# Patient Record
Sex: FEMALE | Race: WHITE
[De-identification: names, ages, dates, MRNs, and addresses within clinical notes are randomized per-mention and may not be internally consistent; named-entity substitution may affect disease eponyms.]

---

## 2018-04-29 ENCOUNTER — HOSPITAL ENCOUNTER (EMERGENCY)
Dept: HOSPITAL 89 - ER | Age: 48
Discharge: HOME | End: 2018-04-29
Payer: MEDICARE

## 2018-04-29 ENCOUNTER — HOSPITAL ENCOUNTER (OUTPATIENT)
Dept: HOSPITAL 89 - AMB | Age: 48
End: 2018-04-29
Payer: MEDICARE

## 2018-04-29 VITALS — DIASTOLIC BLOOD PRESSURE: 63 MMHG | SYSTOLIC BLOOD PRESSURE: 95 MMHG

## 2018-04-29 DIAGNOSIS — G40.909: ICD-10-CM

## 2018-04-29 DIAGNOSIS — R10.30: Primary | ICD-10-CM

## 2018-04-29 DIAGNOSIS — N30.00: Primary | ICD-10-CM

## 2018-04-29 DIAGNOSIS — R19.34: ICD-10-CM

## 2018-04-29 LAB — PLATELET COUNT, AUTOMATED: 187 K/UL (ref 150–450)

## 2018-04-29 PROCEDURE — 82947 ASSAY GLUCOSE BLOOD QUANT: CPT

## 2018-04-29 PROCEDURE — 81001 URINALYSIS AUTO W/SCOPE: CPT

## 2018-04-29 PROCEDURE — 83690 ASSAY OF LIPASE: CPT

## 2018-04-29 PROCEDURE — 84520 ASSAY OF UREA NITROGEN: CPT

## 2018-04-29 PROCEDURE — 82247 BILIRUBIN TOTAL: CPT

## 2018-04-29 PROCEDURE — 84450 TRANSFERASE (AST) (SGOT): CPT

## 2018-04-29 PROCEDURE — 82040 ASSAY OF SERUM ALBUMIN: CPT

## 2018-04-29 PROCEDURE — 96375 TX/PRO/DX INJ NEW DRUG ADDON: CPT

## 2018-04-29 PROCEDURE — 84075 ASSAY ALKALINE PHOSPHATASE: CPT

## 2018-04-29 PROCEDURE — 82565 ASSAY OF CREATININE: CPT

## 2018-04-29 PROCEDURE — 84703 CHORIONIC GONADOTROPIN ASSAY: CPT

## 2018-04-29 PROCEDURE — 84155 ASSAY OF PROTEIN SERUM: CPT

## 2018-04-29 PROCEDURE — 96361 HYDRATE IV INFUSION ADD-ON: CPT

## 2018-04-29 PROCEDURE — 99283 EMERGENCY DEPT VISIT LOW MDM: CPT

## 2018-04-29 PROCEDURE — 84460 ALANINE AMINO (ALT) (SGPT): CPT

## 2018-04-29 PROCEDURE — 87088 URINE BACTERIA CULTURE: CPT

## 2018-04-29 PROCEDURE — 85025 COMPLETE CBC W/AUTO DIFF WBC: CPT

## 2018-04-29 PROCEDURE — 96374 THER/PROPH/DIAG INJ IV PUSH: CPT

## 2018-04-29 PROCEDURE — 84132 ASSAY OF SERUM POTASSIUM: CPT

## 2018-04-29 PROCEDURE — 82150 ASSAY OF AMYLASE: CPT

## 2018-04-29 PROCEDURE — 82435 ASSAY OF BLOOD CHLORIDE: CPT

## 2018-04-29 PROCEDURE — 82374 ASSAY BLOOD CARBON DIOXIDE: CPT

## 2018-04-29 PROCEDURE — 82310 ASSAY OF CALCIUM: CPT

## 2018-04-29 PROCEDURE — 84295 ASSAY OF SERUM SODIUM: CPT

## 2018-04-29 NOTE — ER REPORT
History and Physical


Time Seen By MD:  01:03


HPI/ROS


CHIEF COMPLAINT: Abdominal pain





HISTORY OF PRESENT ILLNESS: 47-year-old female with developmental delay and 

history of seizures brought in by ambulance complaining of lower abdominal pain 

for several hours.  She denies nausea or vomiting or diarrhea.  She denies 

dysuria.  Patient denies history of abdominal surgery.





REVIEW OF SYSTEMS:


Respiratory: No cough, no dyspnea.


Cardiovascular: No chest pain, no palpitations.


Gastrointestinal: As above


Musculoskeletal: No back pain.


Allergies:  


Coded Allergies:  


     sulfamethoxazole (Verified  Allergy, Intermediate, RASH, 4/29/18)


     trimethoprim (Verified  Allergy, Intermediate, RASH, 4/29/18)


Home Meds


Active Scripts


Cephalexin Monohydrate (CEPHALEXIN) 500 Mg Cap, 500 MG PO TID for infection, #

15 CAP


   TAKE 1 CAPSULE tid


   Prov:KIANNA FAUST DO         4/29/18


Discontinued Reported Medications


Prednisone (Prednisone) 20 Mg Tab, 20 MG PO QDAY, #5 0 Refills


   9/6/09


Fluconazole (Diflucan) 100 Mg Tab, 100 MG PO QDAY, 0 Refills


   CURRENTLY ON FOR TOE FUNGUS


   9/6/09


Omega-3 Fatty Acids (Fish Oil) 1 Cap Capsule, 1 CAP PO DAILY, 0 Refills


   9/6/09


Multivitamins W-Minerals (Multivitamin) 1 Cap Capsule, 1 CAP PO DAILY, 0 Refills


   9/6/09


Oxcarbazepine (Trileptal) 300 Mg Tab, 450 MG PO DAILY, 0 Refills


   9/6/09


Sertraline Hcl (Zoloft) 100 Mg Tablet, 150 MG PO QDAY, 0 Refills


   9/6/09


Topiramate (Topamax) 100 Mg Tab, 100 MG PO QDAY, 0 Refills


   9/6/09


Past Medical/Surgical History


Seizure disorder


Reviewed Nurses Notes:  Yes


Old Medical Records Reviewed:  Yes


Constitutional





Vital Sign - Last 24 Hours








 4/29/18 4/29/18 4/29/18 4/29/18





 01:04 01:06 01:14 01:18


 


Temp  98.7  


 


Pulse  77  


 


Resp  12  


 


B/P (MAP) 108/70 (83) 108/70  102/69 (80)


 


Pulse Ox  99 99 


 


O2 Delivery  Room Air  


 


    





 4/29/18 4/29/18 4/29/18 4/29/18





 01:30 01:35 01:50 02:00


 


Pulse  71 67 


 


B/P (MAP) 103/70 (81)   95/63 (74)


 


Pulse Ox  99 99 





 4/29/18   





 02:05   


 


Pulse 70   


 


Pulse Ox 98   








Physical Exam


  General Appearance: The patient is alert, has no immediate need for airway 

protection and no current signs of toxicity.  Vital signs stable, afebrile, 

pulse ox normal


HEENT: Pupils equal and round no injection.  Oropharynx without redness or 

exudate, mucous members are moist


Respiratory: Chest is non tender, lungs are clear to auscultation.


Cardiac: regular rate and rhythm


Gastrointestinal: Abdomen is soft and non tender, no masses, bowel sounds 

normal.


Musculoskeletal:  Neck: Neck is supple and non tender.


Extremities have full range of motion and are non tender.


Skin: No rashes or lesions.





DIFFERENTIAL DIAGNOSIS: After history and physical exam differential diagnosis 

was considered for abdominal pain including but not limited to appendicitis, 

cholecystitis, gastritis and urinary tract infection.





Medical Decision Making


Data Points


Result Diagram:  


4/29/18 0120                                                                   

             4/29/18 0120





Laboratory





Hematology








Test


  4/29/18


01:15 4/29/18


01:20


 


Urine Color Yellow  


 


Urine Clarity Cloudy  


 


Urine pH


  6.0 pH


(4.8-9.5) 


 


 


Urine Specific Gravity 1.020  


 


Urine Protein


  Negative mg/dL


(NEGATIVE) 


 


 


Urine Glucose (UA)


  Negative mg/dL


(NEGATIVE) 


 


 


Urine Ketones


  Negative mg/dL


(NEGATIVE) 


 


 


Urine Blood


  Negative


(NEGATIVE) 


 


 


Urine Nitrite


  Negative


(NEGATIVE) 


 


 


Urine Bilirubin


  Negative


(NEGATIVE) 


 


 


Urine Urobilinogen


  Negative mg/dL


(0.2-1.9) 


 


 


Urine Leukocyte Esterase


  Small


(NEGATIVE) 


 


 


Urine RBC


  1 /HPF


(0-2/HPF) 


 


 


Urine WBC


  15 /HPF


(0-5/HPF) 


 


 


Urine Squamous Epithelial


Cells Many /LPF


(</=FEW) 


 


 


Urine Transitional Epithelial


Cells Many /LPF


(NONE-FEW) 


 


 


Urine Bacteria


  Negative /HPF


(NONE-FEW) 


 


 


Urine Mucus


  Few /HPF


(NONE-FEW) 


 


 


Red Blood Count


  


  4.80 M/uL


(4.17-5.56)


 


Mean Corpuscular Volume


  


  93.8 fL


(80.0-96.0)


 


Mean Corpuscular Hemoglobin


  


  32.4 pg


(26.0-33.0)


 


Mean Corpuscular Hemoglobin


Concent 


  34.5 g/dL


(32.0-36.0)


 


Red Cell Distribution Width


  


  11.9 %


(11.5-14.5)


 


Mean Platelet Volume


  


  8.5 fL


(7.2-11.1)


 


Neutrophils (%) (Auto)


  


  42.2 %


(39.4-72.5)


 


Lymphocytes (%) (Auto)


  


  46.8 %


(17.6-49.6)


 


Monocytes (%) (Auto)


  


  6.9 %


(4.1-12.4)


 


Eosinophils (%) (Auto)


  


  3.7 %


(0.4-6.7)


 


Basophils (%) (Auto)


  


  0.4 %


(0.3-1.4)


 


Nucleated RBC Relative Count


(auto) 


  0.1 /100WBC 


 


 


Neutrophils # (Auto)


  


  3.6 K/uL


(2.0-7.4)


 


Lymphocytes # (Auto)


  


  4.0 K/uL


(1.3-3.6)


 


Monocytes # (Auto)


  


  0.6 K/uL


(0.3-1.0)


 


Eosinophils # (Auto)


  


  0.3 K/uL


(0.0-0.5)


 


Basophils # (Auto)


  


  0.0 K/uL


(0.0-0.1)


 


Nucleated RBC Absolute Count


(auto) 


  0.01 K/uL 


 


 


Sodium Level


  


  145 mmol/L


(137-145)


 


Potassium Level


  


  3.6 mmol/L


(3.5-5.0)


 


Chloride Level


  


  111 mmol/L


()


 


Carbon Dioxide Level


  


  20 mmol/L


(22-31)


 


Blood Urea Nitrogen


  


  13 mg/dl


(7-18)


 


Creatinine


  


  0.90 mg/dl


(0.52-1.04)


 


Glomerular Filtration Rate


Calc 


  > 60.0 


 


 


Random Glucose


  


  89 mg/dl


()


 


Calcium Level


  


  8.9 mg/dl


(8.4-10.2)


 


Total Bilirubin


  


  0.3 mg/dl


(0.2-1.3)


 


Aspartate Amino Transf


(AST/SGOT) 


  22 U/L (0-35) 


 


 


Alanine Aminotransferase


(ALT/SGPT) 


  18 U/L (0-56) 


 


 


Alkaline Phosphatase  61 U/L (0-126) 


 


Total Protein


  


  7.3 gm/dl


(6.3-8.2)


 


Albumin


  


  4.0 g/dl


(3.5-5.0)


 


Amylase Level  73 U/L (0-110) 


 


Lipase


  


  243 U/L


()


 


Human Chorionic Gonadotropin,


Qual 


  Negative


(NEGATIVE)








Chemistry








Test


  4/29/18


01:15 4/29/18


01:20


 


Urine Color Yellow  


 


Urine Clarity Cloudy  


 


Urine pH


  6.0 pH


(4.8-9.5) 


 


 


Urine Specific Gravity 1.020  


 


Urine Protein


  Negative mg/dL


(NEGATIVE) 


 


 


Urine Glucose (UA)


  Negative mg/dL


(NEGATIVE) 


 


 


Urine Ketones


  Negative mg/dL


(NEGATIVE) 


 


 


Urine Blood


  Negative


(NEGATIVE) 


 


 


Urine Nitrite


  Negative


(NEGATIVE) 


 


 


Urine Bilirubin


  Negative


(NEGATIVE) 


 


 


Urine Urobilinogen


  Negative mg/dL


(0.2-1.9) 


 


 


Urine Leukocyte Esterase


  Small


(NEGATIVE) 


 


 


Urine RBC


  1 /HPF


(0-2/HPF) 


 


 


Urine WBC


  15 /HPF


(0-5/HPF) 


 


 


Urine Squamous Epithelial


Cells Many /LPF


(</=FEW) 


 


 


Urine Transitional Epithelial


Cells Many /LPF


(NONE-FEW) 


 


 


Urine Bacteria


  Negative /HPF


(NONE-FEW) 


 


 


Urine Mucus


  Few /HPF


(NONE-FEW) 


 


 


White Blood Count


  


  8.5 k/uL


(4.5-11.0)


 


Red Blood Count


  


  4.80 M/uL


(4.17-5.56)


 


Hemoglobin


  


  15.5 g/dL


(12.0-16.0)


 


Hematocrit


  


  45.0 %


(34.0-47.0)


 


Mean Corpuscular Volume


  


  93.8 fL


(80.0-96.0)


 


Mean Corpuscular Hemoglobin


  


  32.4 pg


(26.0-33.0)


 


Mean Corpuscular Hemoglobin


Concent 


  34.5 g/dL


(32.0-36.0)


 


Red Cell Distribution Width


  


  11.9 %


(11.5-14.5)


 


Platelet Count


  


  187 K/uL


(150-450)


 


Mean Platelet Volume


  


  8.5 fL


(7.2-11.1)


 


Neutrophils (%) (Auto)


  


  42.2 %


(39.4-72.5)


 


Lymphocytes (%) (Auto)


  


  46.8 %


(17.6-49.6)


 


Monocytes (%) (Auto)


  


  6.9 %


(4.1-12.4)


 


Eosinophils (%) (Auto)


  


  3.7 %


(0.4-6.7)


 


Basophils (%) (Auto)


  


  0.4 %


(0.3-1.4)


 


Nucleated RBC Relative Count


(auto) 


  0.1 /100WBC 


 


 


Neutrophils # (Auto)


  


  3.6 K/uL


(2.0-7.4)


 


Lymphocytes # (Auto)


  


  4.0 K/uL


(1.3-3.6)


 


Monocytes # (Auto)


  


  0.6 K/uL


(0.3-1.0)


 


Eosinophils # (Auto)


  


  0.3 K/uL


(0.0-0.5)


 


Basophils # (Auto)


  


  0.0 K/uL


(0.0-0.1)


 


Nucleated RBC Absolute Count


(auto) 


  0.01 K/uL 


 


 


Glomerular Filtration Rate


Calc 


  > 60.0 


 


 


Calcium Level


  


  8.9 mg/dl


(8.4-10.2)


 


Total Bilirubin


  


  0.3 mg/dl


(0.2-1.3)


 


Aspartate Amino Transf


(AST/SGOT) 


  22 U/L (0-35) 


 


 


Alanine Aminotransferase


(ALT/SGPT) 


  18 U/L (0-56) 


 


 


Alkaline Phosphatase  61 U/L (0-126) 


 


Total Protein


  


  7.3 gm/dl


(6.3-8.2)


 


Albumin


  


  4.0 g/dl


(3.5-5.0)


 


Amylase Level  73 U/L (0-110) 


 


Lipase


  


  243 U/L


()


 


Human Chorionic Gonadotropin,


Qual 


  Negative


(NEGATIVE)








Urinalysis








Test


  4/29/18


01:15


 


Urine Color Yellow 


 


Urine Clarity Cloudy 


 


Urine pH


  6.0 pH


(4.8-9.5)


 


Urine Specific Gravity 1.020 


 


Urine Protein


  Negative mg/dL


(NEGATIVE)


 


Urine Glucose (UA)


  Negative mg/dL


(NEGATIVE)


 


Urine Ketones


  Negative mg/dL


(NEGATIVE)


 


Urine Blood


  Negative


(NEGATIVE)


 


Urine Nitrite


  Negative


(NEGATIVE)


 


Urine Bilirubin


  Negative


(NEGATIVE)


 


Urine Urobilinogen


  Negative mg/dL


(0.2-1.9)


 


Urine Leukocyte Esterase


  Small


(NEGATIVE)


 


Urine RBC


  1 /HPF


(0-2/HPF)


 


Urine WBC


  15 /HPF


(0-5/HPF)


 


Urine Squamous Epithelial


Cells Many /LPF


(</=FEW)


 


Urine Transitional Epithelial


Cells Many /LPF


(NONE-FEW)


 


Urine Bacteria


  Negative /HPF


(NONE-FEW)


 


Urine Mucus


  Few /HPF


(NONE-FEW)











ED Course/Re-evaluation


Clinical Indication for ER IV:  Hydration, IV Access


ED Course


Patient was admitted to an examination room.  H&P was done.  The differential 

diagnoses was considered.  Patient was treated with diagnostic blood work is 

unremarkable.  Urinalysis shows signs of urinary tract infection.  Patient was 

started on Keflex.  She reports her pain is better.


Decision to Disposition Date:  Apr 29, 2018


Decision to Disposition Time:  02:03





Depart


Departure


Latest Vital Signs





Vital Signs








  Date Time  Temp Pulse Resp B/P (MAP) Pulse Ox O2 Delivery O2 Flow Rate FiO2


 


4/29/18 02:05  70   98   


 


4/29/18 02:00    95/63 (74)    


 


4/29/18 01:06 98.7  12   Room Air  








Impression:  


 Primary Impression:  


 Abdominal pain


 Additional Impressions:  


 Urinary tract infection


 History of seizure disorder


Condition:  Improved


Disposition:  HOME OR SELF-CARE


Referrals:  


ISMAEL KRUEGER FNEMA (PCP)


New Scripts


Cephalexin Monohydrate (CEPHALEXIN) 500 Mg Cap


500 MG PO TID for infection, #15 CAP


   TAKE 1 CAPSULE tid


   Prov: KIANNA FAUST DO         4/29/18


Patient Instructions:  Urinary Tract Infection in Women (ED)





Additional Instructions:  


Take ibuprofen 2 tablets 3 times a day as needed for pain relief


Take plenty of fluids


Finished taking all of your antibiotics


Follow-up with your doctor if unimproved in 3-5 days.





Problem Qualifiers








 Primary Impression:  


 Abdominal pain


 Abdominal location:  lower abdomen, unspecified  Qualified Codes:  R10.30 - 

Lower abdominal pain, unspecified


 Additional Impressions:  


 Urinary tract infection


 Urinary tract infection type:  acute cystitis  Hematuria presence:  without 

hematuria  Qualified Codes:  N30.00 - Acute cystitis without hematuria








KIANNA FAUST DO Apr 29, 2018 01:07

## 2018-09-12 ENCOUNTER — HOSPITAL ENCOUNTER (INPATIENT)
Dept: HOSPITAL 89 - OR | Age: 48
LOS: 2 days | Discharge: HOME | DRG: 460 | End: 2018-09-14
Attending: ORTHOPAEDIC SURGERY | Admitting: ORTHOPAEDIC SURGERY
Payer: MEDICARE

## 2018-09-12 VITALS — DIASTOLIC BLOOD PRESSURE: 74 MMHG | SYSTOLIC BLOOD PRESSURE: 110 MMHG

## 2018-09-12 VITALS — SYSTOLIC BLOOD PRESSURE: 105 MMHG | DIASTOLIC BLOOD PRESSURE: 69 MMHG

## 2018-09-12 VITALS — DIASTOLIC BLOOD PRESSURE: 78 MMHG | SYSTOLIC BLOOD PRESSURE: 111 MMHG

## 2018-09-12 VITALS — DIASTOLIC BLOOD PRESSURE: 83 MMHG | SYSTOLIC BLOOD PRESSURE: 114 MMHG

## 2018-09-12 VITALS — SYSTOLIC BLOOD PRESSURE: 105 MMHG | DIASTOLIC BLOOD PRESSURE: 53 MMHG

## 2018-09-12 VITALS — SYSTOLIC BLOOD PRESSURE: 116 MMHG | DIASTOLIC BLOOD PRESSURE: 77 MMHG

## 2018-09-12 VITALS — DIASTOLIC BLOOD PRESSURE: 63 MMHG | SYSTOLIC BLOOD PRESSURE: 107 MMHG

## 2018-09-12 VITALS
HEIGHT: 61 IN | WEIGHT: 167 LBS | WEIGHT: 167 LBS | HEIGHT: 61 IN | BODY MASS INDEX: 31.53 KG/M2 | BODY MASS INDEX: 31.53 KG/M2

## 2018-09-12 VITALS — DIASTOLIC BLOOD PRESSURE: 74 MMHG | SYSTOLIC BLOOD PRESSURE: 89 MMHG

## 2018-09-12 VITALS — SYSTOLIC BLOOD PRESSURE: 111 MMHG | DIASTOLIC BLOOD PRESSURE: 62 MMHG

## 2018-09-12 VITALS — SYSTOLIC BLOOD PRESSURE: 117 MMHG | DIASTOLIC BLOOD PRESSURE: 89 MMHG

## 2018-09-12 VITALS — DIASTOLIC BLOOD PRESSURE: 74 MMHG | SYSTOLIC BLOOD PRESSURE: 103 MMHG

## 2018-09-12 DIAGNOSIS — M43.17: Primary | ICD-10-CM

## 2018-09-12 DIAGNOSIS — G40.909: ICD-10-CM

## 2018-09-12 PROCEDURE — 97162 PT EVAL MOD COMPLEX 30 MIN: CPT

## 2018-09-12 PROCEDURE — 0SG30K1 FUSION OF LUMBOSACRAL JOINT WITH NONAUTOLOGOUS TISSUE SUBSTITUTE, POSTERIOR APPROACH, POSTERIOR COLUMN, OPEN APPROACH: ICD-10-PCS | Performed by: ORTHOPAEDIC SURGERY

## 2018-09-12 PROCEDURE — 72100 X-RAY EXAM L-S SPINE 2/3 VWS: CPT

## 2018-09-12 PROCEDURE — 84703 CHORIONIC GONADOTROPIN ASSAY: CPT

## 2018-09-12 PROCEDURE — 72020 X-RAY EXAM OF SPINE 1 VIEW: CPT

## 2018-09-12 PROCEDURE — 36415 COLL VENOUS BLD VENIPUNCTURE: CPT

## 2018-09-12 PROCEDURE — 86901 BLOOD TYPING SEROLOGIC RH(D): CPT

## 2018-09-12 PROCEDURE — 86900 BLOOD TYPING SEROLOGIC ABO: CPT

## 2018-09-12 PROCEDURE — 86850 RBC ANTIBODY SCREEN: CPT

## 2018-09-12 RX ADMIN — HYDROCODONE BITARTRATE AND ACETAMINOPHEN PRN EACH: 5; 325 TABLET ORAL at 14:55

## 2018-09-12 RX ADMIN — DOCUSATE SODIUM SCH MG: 100 CAPSULE, LIQUID FILLED ORAL at 20:20

## 2018-09-12 RX ADMIN — HYDROCODONE BITARTRATE AND ACETAMINOPHEN PRN EACH: 5; 325 TABLET ORAL at 20:21

## 2018-09-12 RX ADMIN — HYDROCODONE BITARTRATE AND ACETAMINOPHEN PRN EACH: 5; 325 TABLET ORAL at 15:45

## 2018-09-12 RX ADMIN — CEFAZOLIN SODIUM SCH MLS/HR: 2 SOLUTION INTRAVENOUS at 16:43

## 2018-09-12 RX ADMIN — DIAZEPAM PRN MG: 5 TABLET ORAL at 15:23

## 2018-09-12 RX ADMIN — OXCARBAZEPINE SCH MG: 150 TABLET, FILM COATED ORAL at 20:21

## 2018-09-12 RX ADMIN — TOPIRAMATE SCH MG: 25 TABLET, FILM COATED ORAL at 20:20

## 2018-09-12 NOTE — OPERATIVE REPORT 1
EVENT DATE:  September 12, 2018 

SURGEON:  Mannie Manjarrez MD 

ANESTHESIOLOGIST:  Javi Garrett MD 

ANESTHESIA:  General.

ASSISTANT: Roberto Thorpe PA-C





PREOPERATIVE DIAGNOSIS  

L5-S1 spondylolisthesis secondary to bilateral L5 pars defect.  



POSTOPERATIVE DIAGNOSIS 

L5-S1 spondylolisthesis secondary to bilateral L5 pars defect.  



PROCEDURES PERFORMED 

1.  L4 to S1 posterolateral instrumented fusion.

2.  L5-S1 laminectomy.  

3.  L4 hemilaminectomy.  



INDICATIONS FOR SURGERY

Juanis is a 48-year-old female who presented to my clinic with a long history of

back pain.  The pain was worse with extension and was also associated with some 

radiating pain to the buttock and into the ankle.  She was found to have 

bilateral pars interarticularis defects at L5 and a grade 2 L5-S1 

spondylolisthesis as a result.  There was severe foraminal narrowing at the 

bilateral L5-S1 level compressing the exiting L5 nerve root.  



Ms. Terrazas has undergone multiple nonsurgical treatment options including 

medications, activity modification, physical therapy, and she ultimately 

underwent bilateral pars interarticularis defect injections which gave her great

relief of her symptoms, but only for a brief period of time.  Secondary to this,

we gave her a second set of injections and, again, she had relief, but only 

briefly.  She was, therefore, offered lumbar laminectomy and fusion at that 

level to address both the back pain as well as the symptoms she was having in 

her ankle.  These were thought to be coming from the compression of the L5 nerve

root.  



Prior to surgery, I explained in detail to the patient and her caregivers the 

possible risks of surgery.  These risks include bleeding, infection, damage to 

surrounding structures, need for further surgery, persistent and/or worsening 

pain, ongoing back pain, spinal fluid leak, meningitis, death, blindness, sexual

dysfunction, autonomic nervous system dysfunction, and other unforeseen medical 

and surgical complications.  An understanding that spinal surgery is more 

predictive at relieving extremity discomfort than axial spine pain was stressed.

 



DESCRIPTION OF PROCEDURE 

On the date of surgery, the patient was met in the preoperative hold area, and 

all questions were answered.  The patient was then brought in good condition to 

the operating room, and after succumbing to anesthesia, was placed in the prone 

position on a Chase table.  All bony protuberances and soft tissues were well 

padded in the standard fashion.  Preoperative antibiotics were administered 

according to the appropriate timing schedule.  Care was taken to maintain 

appropriate perfusion pressures during anesthesia.  At the conclusion of the 

procedure, sponge and needle counts were correct times two.  



A final timeout was undertaken to confirm correct patient, correct levels, and 

correct surgery.  The patient was then prepped and draped in the standard 

sterile orthopedic fashion.  A vertical incision was made overlying the intended

surgical levels.  Sharp dissection was carried down to the posterior elements, 

and soft tissues were elevated off the posterior elements in a subperiosteal 

manner.  A lateral radiograph was obtained to confirm appropriate spinal levels.

 



We exposed the starting points for pedicle screws at L4 and S1.  The starting 

point for the L5 pedicle was quite deep initially, and so we elected to 

incorporate L4 into the fusion as well to give us a better opportunity to reduce

the L5-S1 spondylolisthesis.  Ultimately, we went ahead and dissected out the 

Duffy fragment and used a combination of rongeurs and curettes to remove the Duffy

fragment.  We then placed pedicle screws bilaterally at the S1 and L4 pedicles. 

This was accomplished by identifying the starting point just inferolateral to 

the L5-S1 facets and using a 5 mm florina to decorticate for the S1 screws.  A 

Lenke-type probe was then advanced through the pedicle against resistance.  A 

ball-tipped feeler was used to confirm absence of bony breaching.  We placed 40 

mm pedicle screws bilaterally at the S1 level.  We then turned our attention to 

L4 and identified the starting point, which is approximately the junction of the

pars interarticularis, the midpoint of the transverse processes, and the lateral

border of the superior articular process.  Again, a 5 mm florina was used to 

decorticate the starting point, and bilateral pedicle screws were placed in a 

similar fashion to the S1 screws.  



Placement of these screws allowed us to put a provisional kaz in and do a 

partial reduction of the spondylolisthesis.  Fortunately, once this was 

performed, the partial reduction held and allowed us better access to the 

starting point for L5 pedicle screws.  These were, again, located at the 

intersection of the pars interarticularis of L5, the transverse process of L5, 

and the superior articular process of L5.  A 5 mm florina was used to decorticate 

the starting point, and a Lenke-type probe was advanced against resistance 

through the pedicles bilaterally.  A ball-tipped feeler was used to confirm 

abscess of bony breaching.  Pedicle screws were then placed bilaterally at L5.  

All pedicle screws were tested with neurophysiologic monitoring, and all tested 

within acceptable limits.  



We then irrigated the wound with copious sterile saline solution and chose 60 mm

connecting rods and laid those in the tulips of the screws of L4, L5, and S1.  

Again, a reduction maneuver was performed pulling against the L5 pedicle screws 

after locked the rods down to the S1 screws.  We were, therefore, able to 

accomplish a good reduction of the L5-S1 slip, and once reduction was complete, 

locking caps were placed in all six pedicle screws.  The caps were then 

tightened, and then using the torque limiting device, they were finally 

tightened.  



Attention was then turned to the fusion portion of the procedure.  Transverse 

processes of L4 and L5 were decorticated bilaterally, as was the superior aspect

of the sacral ala bilaterally.  We then used local bone harvested from the Duffy 

fragment as well as L4 hemilaminectomy and packed that into those lateral 

gutters, laying them over the transverse processes and up against the sacral 

ala.  Once this was completed, the wound was closed in layers using a running 

Stratafix suture for the deep fascia, inverted sutures for the superficial 

fascia, and then a running subcuticular skin stitch.  Sponge and needle counts 

were correct times two.  



POSTOPERATIVE CARE PLAN 

Ms. Terrazas will remain in the hospital until she passes physical therapy and 

has adequate pain control.  She will then be discharged home with instructions 

to follow up with me in two weeks for wound check and examination.  

LETICIA

## 2018-09-12 NOTE — RADIOLOGY IMAGING REPORT
[place] [facility] [place] 

 

PATIENT NAME: Juains Terrazas

: 1970

MR: [id number]

V: [id number]

EXAM DATE: 

ORDERING PHYSICIAN: MARISOL ZAMARRIPA

TECHNOLOGIST: 

 

[place] [facility] [place]

Patient: Juanis Terrazas

: 1970

MRN: [id number]

Visit/Account:[id number]

Date of Sevice:  2018

 

ACCESSION #: [id number]

 

Exam type: LUMBAR SPINE 1 VIEW

 

History: L5-S1 DISC HERNIATION AND FUSION

 

Comparison: None.

 

Findings:

 

Two cross table lateral prone intraoperative views of the lower lumbar spine demonstrate posterior tanesha
mbar interbody fusion at L4 L4-5 and L5-S1 with pedicle screws and posterior fixation rods.  There is
 moderate disc space narrowing at L5-S1 and a 5 mm anterior listhesis of L5 with respect S1

 

IMPRESSION:

 

1.  As above

 

Report Dictated By: Yecenia Mix MD at 2018 1:56 PM

 

Report E-Signed By: Yecenia Mix MD  at 2018 2:04 PM

 

WSN:AMICIVRAINE

## 2018-09-12 NOTE — HOSPITALIST PROGRESS NOTE
Subjective


Progress Notes


Subjective


Patient seen post-op. Reviewed PMHx (seizure disorder - none for many years) and


medications (Trileptal). At present she reports significant surgical site pain. 


No CP/SOB/N/V.





Physical Exam





Vital Signs








  Date Time  Temp Pulse Resp B/P (MAP) Pulse Ox O2 Delivery O2 Flow Rate FiO2


 


9/12/18 14:40  88 16  96   


 


9/12/18 08:15 98.5   105/69 (81)  Room Air  








General Appearance:  Alert, Awake


Cardiovascular:  Regular Rate and Rhythm


Respiratory:  Clear to Auscultation


GI:  Soft and Non-Tender





Assessment and Plan


Problems:  


(1) Seizure disorder


Status:  Chronic


Assessment & Plan:  Will continue her usual Trileptal dose. Monitor.














JONATHAN ZAVALA MD            Sep 12, 2018 14:57

## 2018-09-13 VITALS — SYSTOLIC BLOOD PRESSURE: 109 MMHG | DIASTOLIC BLOOD PRESSURE: 69 MMHG

## 2018-09-13 VITALS — DIASTOLIC BLOOD PRESSURE: 70 MMHG | SYSTOLIC BLOOD PRESSURE: 120 MMHG

## 2018-09-13 VITALS — DIASTOLIC BLOOD PRESSURE: 62 MMHG | SYSTOLIC BLOOD PRESSURE: 100 MMHG

## 2018-09-13 VITALS — DIASTOLIC BLOOD PRESSURE: 76 MMHG | SYSTOLIC BLOOD PRESSURE: 109 MMHG

## 2018-09-13 VITALS — DIASTOLIC BLOOD PRESSURE: 75 MMHG | SYSTOLIC BLOOD PRESSURE: 103 MMHG

## 2018-09-13 VITALS — SYSTOLIC BLOOD PRESSURE: 99 MMHG | DIASTOLIC BLOOD PRESSURE: 62 MMHG

## 2018-09-13 RX ADMIN — DIAZEPAM PRN MG: 5 TABLET ORAL at 01:45

## 2018-09-13 RX ADMIN — HYDROCODONE BITARTRATE AND ACETAMINOPHEN PRN EACH: 5; 325 TABLET ORAL at 04:56

## 2018-09-13 RX ADMIN — CEFAZOLIN SODIUM SCH MLS/HR: 2 SOLUTION INTRAVENOUS at 09:08

## 2018-09-13 RX ADMIN — CEFAZOLIN SODIUM SCH MLS/HR: 2 SOLUTION INTRAVENOUS at 01:45

## 2018-09-13 RX ADMIN — OXYCODONE HYDROCHLORIDE PRN MG: 5 TABLET ORAL at 16:39

## 2018-09-13 RX ADMIN — DIAZEPAM PRN MG: 5 TABLET ORAL at 11:33

## 2018-09-13 RX ADMIN — OXYCODONE HYDROCHLORIDE PRN MG: 5 TABLET ORAL at 10:04

## 2018-09-13 RX ADMIN — HYDROCODONE BITARTRATE AND ACETAMINOPHEN PRN EACH: 5; 325 TABLET ORAL at 14:35

## 2018-09-13 RX ADMIN — DOCUSATE SODIUM SCH MG: 100 CAPSULE, LIQUID FILLED ORAL at 21:16

## 2018-09-13 RX ADMIN — TOPIRAMATE SCH MG: 25 TABLET, FILM COATED ORAL at 21:16

## 2018-09-13 RX ADMIN — HYDROCODONE BITARTRATE AND ACETAMINOPHEN PRN EACH: 5; 325 TABLET ORAL at 09:08

## 2018-09-13 RX ADMIN — HYDROCODONE BITARTRATE AND ACETAMINOPHEN PRN EACH: 5; 325 TABLET ORAL at 20:05

## 2018-09-13 RX ADMIN — OXYCODONE HYDROCHLORIDE PRN MG: 5 TABLET ORAL at 01:44

## 2018-09-13 RX ADMIN — DULOXETINE HYDROCHLORIDE SCH MG: 30 CAPSULE, DELAYED RELEASE ORAL at 09:08

## 2018-09-13 RX ADMIN — OXCARBAZEPINE SCH MG: 150 TABLET, FILM COATED ORAL at 21:16

## 2018-09-13 RX ADMIN — DOCUSATE SODIUM SCH MG: 100 CAPSULE, LIQUID FILLED ORAL at 09:08

## 2018-09-13 NOTE — HOSPITALIST PROGRESS NOTE
Subjective


Progress Notes


Subjective


She has no complaints this morning. She had no acute events overnight.





Patient Complains of:


Cardiovascular:  No: Chest Pain


Respiratory:  No: Shortness of Breath





Physical Exam





Vital Signs








  Date Time  Temp Pulse Resp B/P (MAP) Pulse Ox O2 Delivery O2 Flow Rate FiO2


 


9/13/18 07:41 97.9 85 16 100/62 (75) 93 Room Air  


 


9/13/18 01:47       1.0 














Intake and Output 


 


 9/13/18





 07:00


 


Intake Total 3073 ml


 


Balance 3073 ml


 


 


 


Intake Oral 360 ml


 


IV Total 2713 ml


 


# Voids 3








General Appearance:  Alert, Awake, No Acute Distress, Afebrile


Neuro:  No Gross deficits


Cardiovascular:  Regular Rate and Rhythm


Respiratory:  No Respiratory Distress, Clear to Auscultation


Psych:  Alert & Oriented X3, Appropriate Mood & Affect





Assessment and Plan


Problems:  


(1) Seizure disorder


Status:  Chronic


Assessment & Plan:  Will continue her usual Trileptal dose. Monitor.








Exam


Sepsis Risk:  No Definite Risk











CLAIRE POPE            Sep 13, 2018 09:41

## 2018-09-13 NOTE — RADIOLOGY IMAGING REPORT
FACILITY: [facility] 

 

PATIENT NAME: Juanis Terrazas

: 1970

MR: [id number]

V: [id number]

EXAM DATE: 

ORDERING PHYSICIAN: MARISOL ZAMARRIPA

TECHNOLOGIST: 

 

[place] [facility] [place]

Patient: Juanis Terrazas

: 1970

MRN: [id number]

Visit/Account:[id number]

Date of Sevice:  2018

 

ACCESSION #: [id number]

 

Exam type: LUMBAR SPINE 2 OR 3 VIEW

 

History: post-op

 

Comparison: Intraoperative views of lumbar spine 2018.

 

Findings:

 

There are five nonrib-bearing lumbar-type vertebral bodies present.  There are postoperative changes 
from posterior lumbar interbody fusion at L4-5 and L5-S1 with bilateral pedicle screws and posterior 
fixation rods.  Moderate disc space narrowing again noted at L5-S1.  There is a 5.5 mm anterior listh
esis of L5 with respect S1 not significantly changed when compared to the prior study.

 

IMPRESSION:

 

1.  Postoperative changes from posterior lumbar interbody fusion at L4-5 and L5-S1 as described above


 

Report Dictated By: Yecenia Mix MD at 2018 10:09 AM

 

Report E-Signed By: Yecenia Mix MD  at 2018 10:11 AM

 

WSN:[id number]

## 2018-09-14 VITALS — DIASTOLIC BLOOD PRESSURE: 75 MMHG | SYSTOLIC BLOOD PRESSURE: 107 MMHG

## 2018-09-14 VITALS — SYSTOLIC BLOOD PRESSURE: 119 MMHG | DIASTOLIC BLOOD PRESSURE: 75 MMHG

## 2018-09-14 VITALS — SYSTOLIC BLOOD PRESSURE: 113 MMHG | DIASTOLIC BLOOD PRESSURE: 94 MMHG

## 2018-09-14 RX ADMIN — OXYCODONE HYDROCHLORIDE PRN MG: 5 TABLET ORAL at 06:58

## 2018-09-14 RX ADMIN — DULOXETINE HYDROCHLORIDE SCH MG: 30 CAPSULE, DELAYED RELEASE ORAL at 08:53

## 2018-09-14 RX ADMIN — DOCUSATE SODIUM SCH MG: 100 CAPSULE, LIQUID FILLED ORAL at 08:53

## 2018-09-14 RX ADMIN — HYDROCODONE BITARTRATE AND ACETAMINOPHEN PRN EACH: 5; 325 TABLET ORAL at 05:24

## 2018-09-14 RX ADMIN — HYDROCODONE BITARTRATE AND ACETAMINOPHEN PRN EACH: 5; 325 TABLET ORAL at 11:48

## 2018-09-14 RX ADMIN — OXYCODONE HYDROCHLORIDE PRN MG: 5 TABLET ORAL at 01:00

## 2018-09-14 NOTE — HOSPITALIST PROGRESS NOTE
Subjective


Progress Notes


Subjective


She has complaints of pain to the surgical site. Otherwise, has no complaints. 


She had no acute events overnight.





Patient Complains of:


Cardiovascular:  No: Chest Pain


Respiratory:  No: Shortness of Breath





Physical Exam





Vital Signs








  Date Time  Temp Pulse Resp B/P (MAP) Pulse Ox O2 Delivery O2 Flow Rate FiO2


 


9/14/18 07:03     95   


 


9/14/18 07:03      Room Air  


 


9/14/18 07:00 98.3 110 16 119/75 (90)    


 


9/13/18 01:47       1.0 














Intake and Output 


 


 9/14/18





 07:00


 


Intake Total 627 ml


 


Balance 627 ml


 


 


 


Intake Oral 564 ml


 


IV Total 63 ml


 


# Voids 1








General Appearance:  Alert, Awake, No Acute Distress, Afebrile


Neuro:  No Gross deficits


Cardiovascular:  Regular Rate and Rhythm


Respiratory:  No Respiratory Distress, Clear to Auscultation


Psych:  Alert & Oriented X3, Appropriate Mood & Affect





Assessment and Plan


Problems:  


(1) Seizure disorder


Status:  Chronic


Assessment & Plan:  Will continue her usual Trileptal dose. Monitor.








Exam


Sepsis Risk:  No Definite Risk











CLAIRE POPE FNP            Sep 14, 2018 08:42